# Patient Record
Sex: MALE | Race: WHITE | Employment: FULL TIME | URBAN - NONMETROPOLITAN AREA
[De-identification: names, ages, dates, MRNs, and addresses within clinical notes are randomized per-mention and may not be internally consistent; named-entity substitution may affect disease eponyms.]

---

## 2022-01-10 ENCOUNTER — APPOINTMENT (OUTPATIENT)
Dept: GENERAL RADIOLOGY | Age: 32
End: 2022-01-10
Payer: COMMERCIAL

## 2022-01-10 ENCOUNTER — HOSPITAL ENCOUNTER (EMERGENCY)
Age: 32
Discharge: HOME | End: 2022-01-10
Payer: COMMERCIAL

## 2022-01-10 VITALS
OXYGEN SATURATION: 99 % | RESPIRATION RATE: 16 BRPM | DIASTOLIC BLOOD PRESSURE: 91 MMHG | TEMPERATURE: 98 F | HEART RATE: 66 BPM | BODY MASS INDEX: 25.77 KG/M2 | WEIGHT: 180 LBS | SYSTOLIC BLOOD PRESSURE: 155 MMHG | HEIGHT: 70 IN

## 2022-01-10 DIAGNOSIS — S83.91XA SPRAIN OF RIGHT KNEE, UNSPECIFIED LIGAMENT, INITIAL ENCOUNTER: Primary | ICD-10-CM

## 2022-01-10 PROCEDURE — 73564 X-RAY EXAM KNEE 4 OR MORE: CPT

## 2022-01-10 PROCEDURE — 6370000000 HC RX 637 (ALT 250 FOR IP): Performed by: NURSE PRACTITIONER

## 2022-01-10 PROCEDURE — 99283 EMERGENCY DEPT VISIT LOW MDM: CPT

## 2022-01-10 RX ORDER — IBUPROFEN 800 MG/1
800 TABLET ORAL 4 TIMES DAILY PRN
Qty: 40 TABLET | Refills: 0 | Status: SHIPPED | OUTPATIENT
Start: 2022-01-10 | End: 2022-01-20

## 2022-01-10 RX ORDER — IBUPROFEN 800 MG/1
800 TABLET ORAL ONCE
Status: COMPLETED | OUTPATIENT
Start: 2022-01-10 | End: 2022-01-10

## 2022-01-10 RX ADMIN — IBUPROFEN 800 MG: 800 TABLET, FILM COATED ORAL at 16:15

## 2022-01-10 ASSESSMENT — PAIN DESCRIPTION - ORIENTATION: ORIENTATION: RIGHT

## 2022-01-10 ASSESSMENT — PAIN DESCRIPTION - LOCATION: LOCATION: KNEE

## 2022-01-10 ASSESSMENT — PAIN SCALES - GENERAL
PAINLEVEL_OUTOF10: 3
PAINLEVEL_OUTOF10: 3

## 2022-01-10 ASSESSMENT — PAIN DESCRIPTION - PAIN TYPE: TYPE: ACUTE PAIN

## 2022-01-10 NOTE — ED NOTES
Bed:  [place] [facility]  Expected date:   Expected time:   Means of arrival:   Comments:  Natalia Hutchins RN  01/10/22 1522

## 2022-01-10 NOTE — ED PROVIDER NOTES
[facility]  EMERGENCY MEDICINE     Pt Name: Sue Morrow  MRN: [id number]  [place]rog[place]ced 1990  Date of evaluation: 1/10/2022  PCP:    Radha Marlow MD  Provider: Anitra Dutton, APRN - CNP    CHIEF COMPLAINT       Chief Complaint   Patient presents with    Knee Pain           HISTORY OF PRESENT ILLNESS    Julian hernandez is a 28 year male with a PMHx of partial meniscal tears of both the right and left knee who presents today by ambulance with right knee pain. Patient says pain began suddenly after twisting to the left while at work. Patient says he felt a \"pop\" and was unable to bear weight and walk. Patient says the pain is a constant ache 10/10 pain that turns sharp in the medial aspect while bending the knee, bearing weight, and walking. Patient states that he received a cortisone shot in his right knee two months ago and has a scheduled meniscal surgery with [facility] of his left knee. Triage notes and Nursing notes were reviewed by myself. Any discrepancies are addressed above. PAST MEDICAL HISTORY   No past medical history on file. SURGICAL HISTORY     No past surgical history on file. CURRENT MEDICATIONS       Discharge Medication List as of 1/10/2022  4:21 PM          ALLERGIES       Allergies   Allergen Reactions    Penicillins        FAMILY HISTORY     No family history on file.      SOCIAL HISTORY       Social History     Socioeconomic History    Marital status: Single     Spouse name: Not on file    Number of children: Not on file    Years of education: Not on file    Highest education level: Not on file   Occupational History    Not on file   Tobacco Use    Smoking status: Not on file    Smokeless tobacco: Not on file   Substance and Sexual Activity    Alcohol use: Not on file    Drug use: Not on file    Sexual activity: Not on file   Other Topics Concern    Not on file   Social History Narrative    Not on file     Social Determinants of Health     Financial Resource Strain:     Difficulty of Paying Living Expenses: Not on file   Food Insecurity:     Worried About Running Out of Food in the Last Year: Not on file    Luly of Food in the Last Year: Not on file   Transportation Needs:     Lack of Transportation (Medical): Not on file    Lack of Transportation (Non-Medical): Not on file   Physical Activity:     Days of Exercise per Week: Not on file    Minutes of Exercise per Session: Not on file   Stress:     Feeling of Stress : Not on file   Social Connections:     Frequency of Communication with Friends and Family: Not on file    Frequency of Social Gatherings with Friends and Family: Not on file    Attends Christian Services: Not on file    Active Member of [address] [place] [address] [employer] or Organizations: Not on file    Attends Club or Organization Meetings: Not on file    Marital Status: Not on file   Intimate Partner Violence:     Fear of Current or Ex-Partner: Not on file    Emotionally Abused: Not on file    Physically Abused: Not on file    Sexually Abused: Not on file   Housing Stability:     Unable to Pay for Housing in the Last Year: Not on file    Number of [place] in the Last Year: Not on file    Unstable Housing in the Last Year: Not on file       REVIEW OF SYSTEMS     Review of Systems   Musculoskeletal: Positive for gait problem and joint swelling. All other systems reviewed and are negative. Except as noted above the remainder of the review of systems was reviewed and is negative. SCREENINGS                        PHYSICAL EXAM    (up to 7 for level 4, 8 or more for level 5)     ED Triage Vitals [01/10/22 1527]   BP Temp Temp src Pulse Resp SpO2 Height Weight   -- -- -- -- -- -- 5' 10\" (1.778 m) 180 lb (81.6 kg)       Physical Exam  Vitals and nursing note reviewed. Constitutional:       General: He is not in acute distress. Appearance: Normal appearance. HENT:      Head: Normocephalic and atraumatic.    Eyes:      Extraocular Movements: Extraocular movements intact. Musculoskeletal:      Cervical back: Normal range of motion. Right knee: Swelling present. No effusion, ecchymosis, bony tenderness or crepitus. Decreased range of motion. Tenderness present over the medial joint line. Normal pulse. Right lower leg: Normal.      Right ankle: Normal. Normal pulse. Right foot: Normal. Normal pulse. Legs:    Skin:     General: Skin is dry. Neurological:      Mental Status: He is alert. DIAGNOSTIC RESULTS     EKG:(none if blank)  All EKGs are interpreted by the Emergency Department Physician who either signs or Co-signs this chart in the absence of a cardiologist.        RADIOLOGY: (none if blank)   I directly visualized the following images and reviewed the radiologist interpretations. Interpretation per the Radiologist below, if available at the time of this note:  XR KNEE RIGHT (MIN 4 VIEWS)   Final Result       1. Mild degenerative change. 2. No acute fracture noted. .               **This report has been created using voice recognition software. It may contain minor errors which are inherent in voice recognition technology. **      Final report electronically signed by DR Killian Kang on 1/10/2022 3:53 PM          LABS:  Labs Reviewed - No data to display    All other labs were within normal range or not returned as of this dictation. Please note, any cultures that may have been sent were not resulted at the time of this patient visit. EMERGENCY DEPARTMENT COURSE and Medical Decision Making:     Vitals:    Vitals:    01/10/22 1527 01/10/22 1531   BP:  (!) 155/91   Pulse:  66   Resp:  16   Temp:  98 °F (36.7 °C)   TempSrc:  Oral   SpO2:  99%   Weight: 180 lb (81.6 kg)    Height: 5' 10\" (1.778 m)        PROCEDURES: (None if blank)  Procedures       MDM    Presents to ER from work with complaint of a popping sound in me. He states since that time he has been unable to bear weight on the extremity.   X-ray is reassuring. Patient states that he has had problems with both of his knees. He states that he is scheduled to have surgery on his left knee on 1/18/2022 for meniscus tear. He also states that he has had an injection in his right knee. Patient will be discharged with follow-up at either [facility] or orthopedic [place] which ever is advised by his Workmen's Comp. Patient is to be nonweightbearing with a brace until he follows up with orthopedics. Patient is to discuss upcoming surgery with orthopedics when he speaks with him. Strict return precautions and follow up instructions were discussed with the patient with which the patient agrees    ED Medications administered this visit:    Medications   ibuprofen (ADVIL;MOTRIN) tablet 800 mg (800 mg Oral Given 1/10/22 8449)         FINAL IMPRESSION      1.  Sprain of right knee, unspecified ligament, initial encounter          DISPOSITION/PLAN   DISPOSITION Decision To Discharge 01/10/2022 04:41:03 PM      PATIENT REFERRED TO:  [facility], [facility] [phone] [phone]  [phone]          DISCHARGE MEDICATIONS:  Discharge Medication List as of 1/10/2022  4:21 PM      START taking these medications    Details   ibuprofen (ADVIL;MOTRIN) 800 MG tablet Take 1 tablet by mouth 4 times daily as needed for Pain, Disp-40 tablet, R-0Print                    PRANAV Brown CNP (electronically signed)           PRANAV Brown CNP  01/10/22 7899

## 2022-01-10 NOTE — Clinical Note
Mariza Mayfield was seen and treated in our emergency department on 1/10/2022. He may return to work on 01/11/2022. If you have any questions or concerns, please don't hesitate to call.       PRANAV Sherwood - CNP

## 2022-01-10 NOTE — ED TRIAGE NOTES
Presents to ED with c/o right knee pain. States he was working on his knees. States when he stood up and went to turn he hear a pop. States he is unable to put weight on it.

## 2025-03-11 ENCOUNTER — OFFICE VISIT (OUTPATIENT)
Dept: FAMILY MEDICINE CLINIC | Age: 35
End: 2025-03-11
Payer: COMMERCIAL

## 2025-03-11 VITALS
DIASTOLIC BLOOD PRESSURE: 90 MMHG | BODY MASS INDEX: 27.27 KG/M2 | OXYGEN SATURATION: 99 % | TEMPERATURE: 99 F | SYSTOLIC BLOOD PRESSURE: 130 MMHG | HEART RATE: 96 BPM | HEIGHT: 70 IN | WEIGHT: 190.5 LBS

## 2025-03-11 DIAGNOSIS — H10.33 ACUTE CONJUNCTIVITIS OF BOTH EYES, UNSPECIFIED ACUTE CONJUNCTIVITIS TYPE: Primary | ICD-10-CM

## 2025-03-11 PROCEDURE — 99213 OFFICE O/P EST LOW 20 MIN: CPT

## 2025-03-11 RX ORDER — POLYMYXIN B SULFATE AND TRIMETHOPRIM 1; 10000 MG/ML; [USP'U]/ML
1 SOLUTION OPHTHALMIC EVERY 4 HOURS
Qty: 10 ML | Refills: 0 | Status: SHIPPED | OUTPATIENT
Start: 2025-03-11 | End: 2025-03-18

## 2025-03-11 SDOH — ECONOMIC STABILITY: FOOD INSECURITY: WITHIN THE PAST 12 MONTHS, THE FOOD YOU BOUGHT JUST DIDN'T LAST AND YOU DIDN'T HAVE MONEY TO GET MORE.: NEVER TRUE

## 2025-03-11 SDOH — ECONOMIC STABILITY: FOOD INSECURITY: WITHIN THE PAST 12 MONTHS, YOU WORRIED THAT YOUR FOOD WOULD RUN OUT BEFORE YOU GOT MONEY TO BUY MORE.: NEVER TRUE

## 2025-03-11 ASSESSMENT — ENCOUNTER SYMPTOMS
WHEEZING: 0
EYE PAIN: 1
SHORTNESS OF BREATH: 0
EYE DISCHARGE: 1
EYE ITCHING: 1
EYE REDNESS: 1
PHOTOPHOBIA: 0
STRIDOR: 0
COUGH: 0

## 2025-03-11 ASSESSMENT — PATIENT HEALTH QUESTIONNAIRE - PHQ9
1. LITTLE INTEREST OR PLEASURE IN DOING THINGS: NOT AT ALL
2. FEELING DOWN, DEPRESSED OR HOPELESS: NOT AT ALL
SUM OF ALL RESPONSES TO PHQ QUESTIONS 1-9: 0

## 2025-03-11 ASSESSMENT — VISUAL ACUITY: OU: 1

## 2025-03-11 NOTE — PROGRESS NOTES
PROGRESS NOTES      Patient:  Santiago See  YOB: 1990    MRN: [id number]       PCP: Jemma Mercado MD    Last Seen: Visit date not found     Date of Service: Pt seen/examined on 03/11/25     ASSESSMENT/PLAN:    Assessment & Plan  Acute conjunctivitis of both eyes, unspecified acute conjunctivitis type   Acute condition, new, suspect b/l conjunctivitis 2/2 use of omega-3- eyedrops. Recommend stop eyedrops, start polytrim drops OU x 7 day, and follow up with PCP. Recommend return to work 24-48 hours after antibiotics. However Pt requests return to work tomorrow. Caution provider regarding being contagious    Orders:    trimethoprim-polymyxin b (POLYTRIM) 06330-3.1 UNIT/ML-% ophthalmic solution; Place 1 drop into both eyes every 4 hours for 7 days          No follow-ups on file.        Chief Complaint:    Chief Complaint   Patient presents with    Facial Swelling     Eye swelling and redness in right eye. Itchy and burning. Symptoms started yesterday afternoon. Patient started Omega-3 eye drops yesterday. Possible reaction?           History Of Present Illness:      Patient is a 34 y.o. male with no known past medical history presenting for acute visit for 1 day of b/l  eye swelling and redness R > L with persistant discharge and right eye redness/itching/burning without vision changes or vision los, no contacts..Pt works as a , no reported. Sensorium of object in eye or incident of potential harm in eye. Pt does not did use omega 3 eye drops at work recently started prior to this ocular irritation.        Past Medical History:      History reviewed. No pertinent past medical history.    Past Surgical History:      No past surgical history on file.    Current Medications:    No current outpatient medications on file prior to visit.     No current facility-administered medications on file prior to visit.       Allergies:      Penicillins    Social History:      The patient currently

## 2025-05-06 ENCOUNTER — OFFICE VISIT (OUTPATIENT)
Dept: FAMILY MEDICINE CLINIC | Age: 35
End: 2025-05-06
Payer: COMMERCIAL

## 2025-05-06 VITALS
BODY MASS INDEX: 28.92 KG/M2 | WEIGHT: 202 LBS | OXYGEN SATURATION: 96 % | HEIGHT: 70 IN | HEART RATE: 101 BPM | DIASTOLIC BLOOD PRESSURE: 76 MMHG | RESPIRATION RATE: 16 BRPM | SYSTOLIC BLOOD PRESSURE: 132 MMHG | TEMPERATURE: 98.3 F

## 2025-05-06 DIAGNOSIS — Z00.00 ENCOUNTER FOR MEDICAL EXAMINATION TO ESTABLISH CARE: Primary | ICD-10-CM

## 2025-05-06 DIAGNOSIS — R00.0 TACHYCARDIA: ICD-10-CM

## 2025-05-06 DIAGNOSIS — Z11.4 SCREENING FOR HIV (HUMAN IMMUNODEFICIENCY VIRUS): ICD-10-CM

## 2025-05-06 DIAGNOSIS — F32.5 MAJOR DEPRESSIVE DISORDER IN FULL REMISSION, UNSPECIFIED WHETHER RECURRENT: ICD-10-CM

## 2025-05-06 DIAGNOSIS — Z72.0 TOBACCO ABUSE: ICD-10-CM

## 2025-05-06 DIAGNOSIS — F41.9 ANXIETY: ICD-10-CM

## 2025-05-06 DIAGNOSIS — Z11.59 ENCOUNTER FOR HEPATITIS C SCREENING TEST FOR LOW RISK PATIENT: ICD-10-CM

## 2025-05-06 DIAGNOSIS — F90.2 ATTENTION DEFICIT HYPERACTIVITY DISORDER (ADHD), COMBINED TYPE: ICD-10-CM

## 2025-05-06 PROCEDURE — 99214 OFFICE O/P EST MOD 30 MIN: CPT | Performed by: NURSE PRACTITIONER

## 2025-05-06 RX ORDER — BUPROPION HYDROCHLORIDE 200 MG/1
TABLET, EXTENDED RELEASE ORAL
COMMUNITY
Start: 2025-04-26 | End: 2025-05-06 | Stop reason: SDUPTHER

## 2025-05-06 RX ORDER — ATOMOXETINE 80 MG/1
80 CAPSULE ORAL DAILY
Qty: 90 CAPSULE | Refills: 1 | Status: SHIPPED | OUTPATIENT
Start: 2025-05-06

## 2025-05-06 RX ORDER — BUPROPION HYDROCHLORIDE 100 MG/1
TABLET, EXTENDED RELEASE ORAL
Qty: 90 TABLET | Refills: 1 | Status: SHIPPED | OUTPATIENT
Start: 2025-05-06

## 2025-05-06 RX ORDER — BUPROPION HYDROCHLORIDE 200 MG/1
TABLET, EXTENDED RELEASE ORAL
Qty: 90 TABLET | Refills: 1 | Status: SHIPPED | OUTPATIENT
Start: 2025-05-06

## 2025-05-06 RX ORDER — MIRTAZAPINE 15 MG/1
TABLET, FILM COATED ORAL
COMMUNITY
Start: 2025-04-23

## 2025-05-06 RX ORDER — NEOMYCIN SULFATE, POLYMYXIN B SULFATE AND DEXAMETHASONE 3.5; 10000; 1 MG/ML; [USP'U]/ML; MG/ML
SUSPENSION/ DROPS OPHTHALMIC
COMMUNITY
Start: 2025-03-12

## 2025-05-06 RX ORDER — ATOMOXETINE 80 MG/1
CAPSULE ORAL
COMMUNITY
Start: 2025-04-23 | End: 2025-05-06 | Stop reason: SDUPTHER

## 2025-05-06 ASSESSMENT — ENCOUNTER SYMPTOMS
RESPIRATORY NEGATIVE: 1
GASTROINTESTINAL NEGATIVE: 1
PHOTOPHOBIA: 0

## 2025-05-06 NOTE — PROGRESS NOTES
[facility]  [address]  [place] [address]  Dept: [phone]  Dept Fax: [phone]  Loc: [phone]    Santiago See is a 35 y.o. malewho presents today for his medical conditions/complaints as noted below.  Santiago Seeis c/o of New Patient (To get established, medication management, no concerns )      :     HPI    Pt here to establish care   Works full time at [employer] a    Just had a caffeine drink before visit     ADHD  -dx in 2019  Taking straterra 80 ,mg daily  -working well  -helps him to focus at work and home   -sleeps well at night     MDD/Anxiety/Insomnia  -takes wellbutrin 200 mg, remuron 15 mg daily   -states working well , sleeps well at hs  -denies HI or SI     Smokes less than `1 ppd    Current Outpatient Medications   Medication Sig Dispense Refill    mirtazapine (REMERON) 15 MG tablet       atomoxetine (STRATTERA) 80 MG capsule Take 1 capsule by mouth daily 90 capsule 1    buPROPion (WELLBUTRIN SR) 200 MG extended release tablet 1 tablet by mouth once day 90 tablet 1    buPROPion (WELLBUTRIN SR) 100 MG extended release tablet 1 tablet po daily 90 tablet 1    neomycin-polymyxin-dexameth (MAXITROL) 3.5-37572-0.1 ophthalmic suspension INSTILL 1 DROP INTO EACH EYE EVERY 4 HOURS FOR 5 DAYS (Patient not taking: Reported on 5/6/2025)       No current facility-administered medications for this visit.       History reviewed. No pertinent past medical history.   History reviewed. No pertinent surgical history.  History reviewed. No pertinent family history.  Social History     Tobacco Use    Smoking status: Every Day     Types: Cigarettes     Passive exposure: Current    Smokeless tobacco: Never   Substance Use Topics    Alcohol use: Not Currently        Allergies   Allergen Reactions    Penicillins        Health Maintenance   Topic Date Due    HIV screen  Never done    Hepatitis C screen  Never done    Hepatitis B vaccine (1

## 2025-06-10 RX ORDER — MIRTAZAPINE 15 MG/1
15 TABLET, FILM COATED ORAL NIGHTLY
Qty: 90 TABLET | Refills: 1 | Status: SHIPPED | OUTPATIENT
Start: 2025-06-10

## 2025-06-10 NOTE — TELEPHONE ENCOUNTER
Recent Visits  Date Type Provider Dept   05/06/25 Office Visit Juan Francisco Brumfield APRN - CNP [facility]   Showing recent visits within past 540 days with a meds authorizing provider and meeting all other requirements  Future Appointments  Date Type Provider Dept   11/06/25 Appointment Juan Francisco Brumfield APRN - CNP [facility]   Showing future appointments within next 150 days with a meds authorizing provider and meeting all other requirements